# Patient Record
Sex: FEMALE | URBAN - METROPOLITAN AREA
[De-identification: names, ages, dates, MRNs, and addresses within clinical notes are randomized per-mention and may not be internally consistent; named-entity substitution may affect disease eponyms.]

---

## 2021-03-18 ENCOUNTER — APPOINTMENT (OUTPATIENT)
Dept: URBAN - METROPOLITAN AREA CLINIC 271 | Age: 76
Setting detail: DERMATOLOGY
End: 2021-03-18

## 2021-03-18 PROBLEM — L72.0 EPIDERMAL CYST: Status: ACTIVE | Noted: 2021-03-18

## 2021-03-18 PROCEDURE — OTHER SEPARATE AND IDENTIFIABLE DOCUMENTATION: OTHER

## 2021-03-18 PROCEDURE — OTHER TREATMENT REGIMEN: OTHER

## 2021-03-18 PROCEDURE — OTHER ACNE SURGERY: OTHER

## 2021-03-18 PROCEDURE — 10040 ACNE SURGERY: CPT

## 2021-03-18 PROCEDURE — 99202 OFFICE O/P NEW SF 15 MIN: CPT | Mod: 25

## 2021-03-18 PROCEDURE — OTHER COUNSELING: OTHER

## 2021-03-18 NOTE — PROCEDURE: TREATMENT REGIMEN
Initiate Treatment: [place] posey benzoyl peroxide bid x 2 weeks\\nSample provided
Detail Level: Zone

## 2021-03-18 NOTE — HPI: SKIN LESION
What Type Of Note Output Would You Prefer (Optional)?: Bullet Format
How Severe Is Your Skin Lesion?: moderate
Has Your Skin Lesion Been Treated?: not been treated
Is This A New Presentation, Or A Follow-Up?: Skin Lesion
Additional History: Patient caregiver states that lesion appeared about 2 weeks ago. Caregiver believes lesion to be a cyst. This lesion has gotten Larger and irritates patient. Caregiver states that patient has been here before and had biopsy on face. Caregiver declines FBSE today.

## 2021-03-18 NOTE — PROCEDURE: ACNE SURGERY
Render Number Of Lesions Treated: yes
Render Post-Care Instructions In Note?: no
Post-Care Instructions: I reviewed with the patient in detail post-care instructions. Patient is to keep the treatment areas dry overnight, and then apply bacitracin twice daily until healed. Patient may apply hydrogen peroxide soaks to remove any crusting.
Prep Text (Optional): Prior to removal the treatment areas were prepped in the usual fashion.
Acne Type: Comedonal Lesions
Extraction Method: cotton-tipped applicators and gentle pressure
Detail Level: Detailed
Consent was obtained and risks were reviewed including but not limited to scarring, infection, bleeding, scabbing, incomplete removal, and allergy to anesthesia.